# Patient Record
Sex: FEMALE | Race: WHITE | Employment: FULL TIME | RURAL
[De-identification: names, ages, dates, MRNs, and addresses within clinical notes are randomized per-mention and may not be internally consistent; named-entity substitution may affect disease eponyms.]

---

## 2018-02-27 ENCOUNTER — TELEPHONE (OUTPATIENT)
Dept: FAMILY MEDICINE CLINIC | Age: 28
End: 2018-02-27

## 2018-02-27 NOTE — TELEPHONE ENCOUNTER
Received call from pt. Pt. C/o having diarrhea, N/V since 9am today, pt. Said she is in [place] sitting in [employer] parking lot having panic attack; pt. Calmed down after talking on the phone for a few minutes; advised pt. That we have no openings at our clinic today; pt. Stated she is attempting to go to [place] to be seen at urgent care; advised pt.  To drink plenty fluids to avoid dehydration, and to call us back if she would like to make an appt to be seen tomorrow

## 2021-03-23 ENCOUNTER — OFFICE VISIT (OUTPATIENT)
Dept: FAMILY MEDICINE CLINIC | Age: 31
End: 2021-03-23
Payer: COMMERCIAL

## 2021-03-23 VITALS
WEIGHT: 141 LBS | HEIGHT: 61 IN | DIASTOLIC BLOOD PRESSURE: 80 MMHG | TEMPERATURE: 97.6 F | BODY MASS INDEX: 26.62 KG/M2 | HEART RATE: 72 BPM | RESPIRATION RATE: 16 BRPM | SYSTOLIC BLOOD PRESSURE: 108 MMHG

## 2021-03-23 DIAGNOSIS — N93.9 ABNORMAL VAGINAL BLEEDING: ICD-10-CM

## 2021-03-23 DIAGNOSIS — B00.2 ORAL HERPES SIMPLEX INFECTION: Primary | ICD-10-CM

## 2021-03-23 DIAGNOSIS — Z00.00 WELL ADULT EXAM: ICD-10-CM

## 2021-03-23 PROCEDURE — 99395 PREV VISIT EST AGE 18-39: CPT | Performed by: NURSE PRACTITIONER

## 2021-03-23 RX ORDER — VALACYCLOVIR HYDROCHLORIDE 1 G/1
TABLET, FILM COATED ORAL
Qty: 4 TAB | Refills: 0 | Status: SHIPPED | OUTPATIENT
Start: 2021-03-23

## 2021-03-23 NOTE — ACP (ADVANCE CARE PLANNING)
Discussed importance of advanced medical directives with patient. Patient is capable of making decisions.  Angeles SANCHEZ

## 2021-03-23 NOTE — PROGRESS NOTES
Subjective:     Roxy Ferguson is a 31 y.o. female who presents today with the following:  Chief Complaint   Patient presents with    Lip Swelling     upper       Patient Active Problem List   Diagnosis Code    Pregnancy Z34.90    Insomnia G47.00    Depression F32.9         COMPLIANT WITH MEDICATION:   Denies chest pain, dyspnea, palpitations, headache and blurred vision. Blood pressure normotensive. Upper lip pain; patient woke up this morning with upper lip pain and edema; was not present when she went to sleep last night, pain is so severe it makes her lip feel numb and burning. Abnormal bleeding; patient had a miscarriage beginning of February. Is seeing Bao Crawford with [facility]. Has been bleeding for the last 6 weeks. Has a dermoid cyst of her right ovary.      depression screening addressed not at risk    abuse screening addressed denies    learning assessment addressed reviewed nurses notes    fall risk addressed not at risk    HM: addressed, up to date    ROS:  Gen: denies fever, chills, fatigue, weight loss, weight gain  HEENT:denies blurry vision, nasal congestion, sore throat  Resp: denies dypsnea, cough, wheezing  CV: denies chest pain radiating to the jaws or arms, palpitations, lower extremity edema  Abd: denies nausea, vomiting, diarrhea, constipation  Neuro: denies numbness/tingling  Endo: denies polyuria, polydipsia, heat/cold intolerance  Heme: no lymphadenopathy    Allergies   Allergen Reactions    Penicillins Hives and Nausea and Vomiting    Shellfish Derived Swelling         Current Outpatient Medications:     valACYclovir (VALTREX) 1 gram tablet, 2000 mg PO q12h x1 day  (Take 2 tablets by mouth every 12 hours  For a total of 4 tablets )  Indications: suppression oral herpes, Disp: 4 Tab, Rfl: 0    DULoxetine (CYMBALTA) 60 mg capsule, TAKE 1 CAPSULE BY MOUTH ONCE DAILY, Disp: 30 Cap, Rfl: 0    brompheniramine-pseudoeph-DM (BROMFED DM) 2-30-10 mg/5 mL syrup, Take 10 mL by mouth four (4) times daily as needed for Cough., Disp: 118 mL, Rfl: 1  •  azithromycin (ZITHROMAX) 250 mg tablet, Take 2 tablets today, then take 1 tablet daily  Indications: a bacterial infection of the middle ear, Disp: 6 Tab, Rfl: 0  •  methylPREDNISolone (MEDROL DOSEPACK) 4 mg tablet, Per dose pack instructions, Disp: 1 Dose Pack, Rfl: 0  •  guaiFENesin ER (MUCINEX) 600 mg ER tablet, Take 1 Tab by mouth two (2) times a day. Indications: Cold Symptoms, congestion (Patient taking differently: Take 600 mg by mouth two (2) times a day. Not a current medication  Indications: Cold Symptoms, congestion), Disp: 30 Tab, Rfl: 1  •  clobetasol (TEMOVATE) 0.05 % ointment, , Disp: , Rfl:   •  montelukast (SINGULAIR) 10 mg tablet, TAKE 1 TABLET BY MOUTH DAILY. (Patient taking differently: TAKE 1 TABLET BY MOUTH DAILY. Not a current medication), Disp: 30 Tab, Rfl: 5  •  triamcinolone acetonide (KENALOG) 0.1 % topical cream, APPLY TO AFFECTED AREA TWO (2) TIMES A DAY. USE THIN LAYER (Patient taking differently: PRN), Disp: 60 g, Rfl: 0  •  acyclovir (ZOVIRAX) 5 % ointment, APPLY  TO AFFECTED AREA EVERY THREE (3) HOURS AS NEEDED., Disp: 30 g, Rfl: 3  •  fluticasone (FLONASE) 50 mcg/actuation nasal spray, 2 Sprays by Both Nostrils route daily. (Patient taking differently: 2 Sprays by Both Nostrils route daily. PRN), Disp: 1 Bottle, Rfl: 2  •  albuterol (PROVENTIL HFA, VENTOLIN HFA, PROAIR HFA) 90 mcg/actuation inhaler, Take 2 Puffs by inhalation every four (4) hours as needed for Wheezing. (Patient taking differently: Take 2 Puffs by inhalation every four (4) hours as needed for Wheezing. Not a current medication), Disp: 1 Inhaler, Rfl: 5    Past Medical History:   Diagnosis Date   • Anemia     with pregnancy, takes iron   • Asthma     childhood asthma   • Depression 6/1/2015   • Dermoid cyst of ovary     rt   • Eczema    • Miscarriage 02/15/2021       History reviewed. No pertinent surgical history.    Social History     Tobacco  Use   Smoking Status Former Smoker    Packs/day: 0.50    Years: 2.00    Pack years: 1.00    Types: Cigarettes    Quit date: 2013    Years since quittin.5   Smokeless Tobacco Never Used       Social History     Socioeconomic History    Marital status: SINGLE     Spouse name: Not on file    Number of children: Not on file    Years of education: Not on file    Highest education level: Not on file   Tobacco Use    Smoking status: Former Smoker     Packs/day: 0.50     Years: 2.00     Pack years: 1.00     Types: Cigarettes     Quit date: 2013     Years since quittin.5    Smokeless tobacco: Never Used   Substance and Sexual Activity    Alcohol use: No     Alcohol/week: 0.0 standard drinks    Drug use: No    Sexual activity: Yes     Partners: Male       Family History   Problem Relation Age of Onset    Hypertension Mother     Other Brother         cerbral palsay - not genatic    No Known Problems Sister          Objective:     Visit Vitals  /80 (BP 1 Location: Left upper arm, BP Patient Position: Sitting, BP Cuff Size: Adult)   Pulse 72   Temp 97.6 °F (36.4 °C) (Temporal)   Resp 16   Ht 5' 1\" (1.549 m)   Wt 141 lb (64 kg)   LMP 2021 Comment: bleeding since miscairage    BMI 26.64 kg/m²     Body mass index is 26.64 kg/m². General: Alert and oriented. No acute distress. Well nourished  HEENT :  Ears:TMs are normal. Canals are clear. Eyes: pupils equal, round, react to light and accommodation. Nose: patent. Mouth: two blisters on medial upper lip, edematous, erythematous   Neck:supple full range of motion no thyromegaly. Trachea midline, No carotid bruits. No significant lymphadenopathy  Lungs[de-identified] clear to auscultation without wheezes, rales, or rhonchi. Heart :RRR, S1 & S2 are normal intensity. No murmur; no gallop  Abdomen: bowel sounds active. No tenderness, guarding, rebound, masses, hepatic or spleen enlargement  Back: no CVA tenderness.   Extremities: without clubbing, cyanosis, or edema  Pulses: radial and femoral pulses are normal  Neuro: HMF intact. Cranial nerves II through XII grossly normal.  Motor: is 5 over 5 and symmetrical.   Deep tendon reflexes: +2 equal  Psych:appropriate behavior, mood, affect and judgement. Results for orders placed or performed in visit on 19   AMB POC RAPID STREP A   Result Value Ref Range    VALID INTERNAL CONTROL POC Yes     Group A Strep Ag Negative Negative       No results found for this visit on 21. Assessment/ Plan:     1. Oral herpes simplex infection  Start Valtrex    2. Well adult exam    future labs   3. Abnormal vaginal bleeding  Followed by obgyn at [address] [place] [address] DIFF; Future  - LIPID PANEL; Future  - COLLECTION VENOUS BLOOD,VENIPUNCTURE; Future      Orders Placed This Encounter    COLLECTION VENOUS BLOOD,VENIPUNCTURE     Standing Status:   Future     Standing Expiration Date:   2021    COLLECTION VENOUS BLOOD,VENIPUNCTURE     Standing Status:   Future     Standing Expiration Date:   2021    CBC WITH AUTOMATED DIFF     Standing Status:   Future     Standing Expiration Date:   2021    LIPID PANEL     Standing Status:   Future     Standing Expiration Date:   2021    valACYclovir (VALTREX) 1 gram tablet     Si mg PO q12h x1 day  (Take 2 tablets by mouth every 12 hours  For a total of 4 tablets )  Indications: suppression oral herpes     Dispense:  4 Tab     Refill:  0         Verbal and written instructions (see AVS) provided. Patient expresses understanding of diagnosis and treatment plan.     Health Maintenance Due   Topic Date Due    Hepatitis C Screening  Never done    PAP AKA CERVICAL CYTOLOGY  2018         f/u within 1 month for lab work       FANNIE Mcduffie

## 2021-03-23 NOTE — PROGRESS NOTES
1. Have you been to the ER, urgent care clinic since your last visit? Hospitalized since your last visit? No    2. Have you seen or consulted any other health care providers outside of the [address] [place] [address] since your last visit? Include any pap smears or colon screening.  [id number]      Chief Complaint   Patient presents with    Lip Swelling     upper         Visit Vitals  /80 (BP 1 Location: Left upper arm, BP Patient Position: Sitting, BP Cuff Size: Adult)   Pulse 72   Temp 97.6 °F (36.4 °C) (Temporal)   Resp 16   Ht 5' 1\" (1.549 m)   Wt 141 lb (64 kg)   LMP 03/23/2021 Comment: bleeding since miscairage 2-2021   BMI 26.64 kg/m²       Pain Scale: 10 - Worst pain ever/10  Pain Location: (lip)

## 2023-05-16 RX ORDER — ACYCLOVIR 50 MG/G
OINTMENT TOPICAL
COMMUNITY
Start: 2016-06-09

## 2023-05-16 RX ORDER — FLUTICASONE PROPIONATE 50 MCG
2 SPRAY, SUSPENSION (ML) NASAL DAILY
COMMUNITY
Start: 2016-01-19

## 2023-05-16 RX ORDER — MONTELUKAST SODIUM 10 MG/1
1 TABLET ORAL DAILY
COMMUNITY
Start: 2019-07-09

## 2023-05-16 RX ORDER — GUAIFENESIN 600 MG/1
600 TABLET, EXTENDED RELEASE ORAL 2 TIMES DAILY
COMMUNITY
Start: 2019-08-29

## 2023-05-16 RX ORDER — CLOBETASOL PROPIONATE 0.5 MG/G
OINTMENT TOPICAL
COMMUNITY
Start: 2019-07-31

## 2023-05-16 RX ORDER — VALACYCLOVIR HYDROCHLORIDE 1 G/1
TABLET, FILM COATED ORAL
COMMUNITY
Start: 2021-03-23

## 2023-05-16 RX ORDER — METHYLPREDNISOLONE 4 MG/1
TABLET ORAL
COMMUNITY
Start: 2019-12-16

## 2023-05-16 RX ORDER — TRIAMCINOLONE ACETONIDE 1 MG/G
CREAM TOPICAL
COMMUNITY
Start: 2016-06-30